# Patient Record
Sex: MALE | Race: WHITE | NOT HISPANIC OR LATINO
[De-identification: names, ages, dates, MRNs, and addresses within clinical notes are randomized per-mention and may not be internally consistent; named-entity substitution may affect disease eponyms.]

---

## 2021-05-03 PROBLEM — Z00.00 ENCOUNTER FOR PREVENTIVE HEALTH EXAMINATION: Status: ACTIVE | Noted: 2021-05-03

## 2021-05-04 ENCOUNTER — APPOINTMENT (OUTPATIENT)
Dept: THORACIC SURGERY | Facility: CLINIC | Age: 61
End: 2021-05-04
Payer: COMMERCIAL

## 2021-05-04 VITALS — HEIGHT: 67 IN | BODY MASS INDEX: 38.92 KG/M2 | WEIGHT: 248 LBS

## 2021-05-04 DIAGNOSIS — T65.291A TOXIC EFFECT OF OTHER TOBACCO AND NICOTINE, ACCIDENTAL (UNINTENTIONAL), INITIAL ENCOUNTER: ICD-10-CM

## 2021-05-04 DIAGNOSIS — Z77.090 CONTACT WITH AND (SUSPECTED) EXPOSURE TO ASBESTOS: ICD-10-CM

## 2021-05-04 PROCEDURE — 99406 BEHAV CHNG SMOKING 3-10 MIN: CPT | Mod: 25

## 2021-05-04 PROCEDURE — G0296 VISIT TO DETERM LDCT ELIG: CPT

## 2021-05-04 PROCEDURE — 99072 ADDL SUPL MATRL&STAF TM PHE: CPT

## 2021-05-04 NOTE — PLAN
[FreeTextEntry1] : Plan:\par \par -Low dose CT chest for lung cancer screening. Dr. Rebollar has been CC'd in order for LDCT.\par \par -Follow up with patient and his referring provider after his LDCT results have been reviewed by the multidisciplinary clinical team.\par \par -Encouraged smoking cessation.\par \par -Referred to smoking cessation program at -[phone] and [employer] [employer]\par \par -Patient declines referral to CTC \par \par \par Should I screen? tool utilized. 6 Year risk of lung cancer is 2.1%. Patient wishes to proceed with screening.\par \par Engaged in discussion regarding risks of screening during Covid-19 pandemic and precautions that are being used  to reduce exposure.\par \par Engaged in shared decision making with Mr. PAREDES . Answered all questions. He verbalized understanding and agreement. He knows to call back with and questions or concerns.\par

## 2021-05-04 NOTE — ASSESSMENT
[Discussed Risks and Advised to Quit Smoking] : Discussed risks and advised to quit smoking [Discussed Cessation Medication] : cessation medication was discussed [Discussed Cessation Strategies] : cessation strategies were discussed [Smoking Cessation Counseling Given (more than 3 min less than10 min) and Resources Provided] : Smoking cessation counseling given (more than 3 min less than 10 min) and resources provided [Not Ready] : Patient is not ready for cessation intervention [Contemplation] : Contemplation: The patient is considering quitting smoking [de-identified] : Acknowledged how  difficult it is to quit smoking. Advised that quitting smoking is the most important thing a person can do for their health. Discussed strategies to deal with cravings. Suggested exercise as a distraction. Discussed the importance of having a strategy planned in advance of quit date. Discussed use of daily nicotine patch and use of gum or lozenge, 1-2 every hour as needed for cravings. Instructed in proper use of gum and patch. He does not wish to try NRT at this point but agrees to contact writer when ready to discuss further. He agrees to accept information for the [facility] smoking cessation support program\par 4 minutes spent in smoking cessation counseling.\par

## 2021-05-04 NOTE — HISTORY OF PRESENT ILLNESS
[Current] : current smoker [_____ pack-years] : [unfilled] pack-years [TextBox_13] : Referred by Dr. Rebollar\par \par ZEYNEP PAREDES had telephonic visit for a review of eligibility and discussion of the Low dose CT lung cancer screening program. A telephonic visit occurred due to the patient not having access to a smart phone or a computer for an audio/visual visit.  The following was reviewed and confirmed the patient meets screening eligibility criteria.\par -Age 60 year\par Smoking Status:\par -Current smoker\par Started smoking at 16 years old. Smoked an average of 1.25 PPD for 33 years+\par , then quit for 10 years, now smoking 8 cigarillos per day for the past 8 years.\par -Number of pack years smoking: Cigarette PY =41 + Cigarillos PY = 6 as per. Total pack years 47\par \par Mr. PAREDES   denies any signs or symptoms of lung cancer including new cough, changing cough, hemoptysis, and unintentional weight loss. \par \par Mr. PAREDES  has history of HTN and "chest" stents placed in 2001. He denies any personal history of lung cancer. Reports no lung cancer in a 1st degree relative. Denies and history of lung disease. He works for [employer] and was present at the [facility] on 9/11 and was there overnight after the collapse of the towers. He is in the [facility] Health Registry. He reports history of  occupational exposures to asbestos.\par

## 2021-05-04 NOTE — REASON FOR VISIT
[Home] : at home, [unfilled] , at the time of the visit. [Medical Office: ([facility])___] : at the medical office located in  [Verbal consent obtained from patient] : the patient, [unfilled] [Initial Evaluation] : an initial evaluation visit [Review of Eligibility] : review of eligibility [Low-Dose CT Screening Discussion] : low-dose CT lung cancer screening discussion

## 2021-05-17 ENCOUNTER — NON-APPOINTMENT (OUTPATIENT)
Age: 61
End: 2021-05-17

## 2021-07-15 ENCOUNTER — RESULT REVIEW (OUTPATIENT)
Age: 61
End: 2021-07-15

## 2022-05-10 ENCOUNTER — APPOINTMENT (OUTPATIENT)
Dept: THORACIC SURGERY | Facility: CLINIC | Age: 62
End: 2022-05-10
Payer: COMMERCIAL

## 2022-05-10 VITALS — WEIGHT: 255 LBS | HEIGHT: 67 IN | BODY MASS INDEX: 40.02 KG/M2

## 2022-05-10 PROCEDURE — G0296 VISIT TO DETERM LDCT ELIG: CPT

## 2022-05-10 NOTE — REASON FOR VISIT
[Home] : at home, [unfilled] , at the time of the visit. [Medical Office: ([facility])___] : at the medical office located in  [Verbal consent obtained from patient] : the patient, [unfilled] [Annual Follow-Up] : an annual follow-up visit [Review of Eligibility] : review of eligibility [Low-Dose CT Screening Discussion] : low-dose CT lung cancer screening discussion

## 2022-05-10 NOTE — ASSESSMENT
[Discussed Risks and Advised to Quit Smoking] : Discussed risks and advised to quit smoking [Ready] : Patient is ready for cessation intervention [Contemplation] : Contemplation: The patient is considering quitting smoking [de-identified] : Acknowledged how  difficult it is to quit smoking. Advised that quitting smoking is the most important thing a person can do for their health.  Discussed the importance of having a strategy planned in advance of quit date. Discussed use of daily nicotine patch. He wants to quit smoking cigarillos cold turkey as he quit cigarettes, He agrees to contact writer at any time he feels he needs assistance in quitting.\par

## 2022-05-10 NOTE — DATA REVIEWED
[Lung Cancer Screening] : Patient underwent lung cancer screening [2] : 2 [TextBox_12] : 05/21 [TextBox_52] : 1

## 2022-05-10 NOTE — HISTORY OF PRESENT ILLNESS
[Current] : current smoker [_____ pack-years] : [unfilled] pack-years [TextBox_13] : Referred by Dr. Rebollar\par \par ZEYNEP PAREDES had telephonic visit for a review of eligibility and discussion of the Low dose CT lung cancer screening program. A telephonic visit occurred due to the patient not having access to a smart phone or a computer for an audio/visual visit.  The following was reviewed and confirmed the patient meets screening eligibility criteria.\par -Age 61 year\par Smoking Status:\par \par -Current smoker\par Smoked 1.25 PPD for 33 years, then quit for 10 years, then smoked 6-8 cigarillos for 9 years.\par 48 Pack years\par \par \par Mr. PAREDES  has morning cough- clear sputum and SAUER when going uphill. He denies any signs or symptoms of lung cancer including new cough, changing cough, hemoptysis, and unintentional weight loss. \par \par Mr. PAREDES history of HTN, CAD with 2 cardiac stents. He  denies any personal history of lung cancer. Reports no lung cancer in a 1st degree relative. Reports no lung cancer in a 2nd degree relative. Denies any history of lung disease. Is retired [employer]. Was at [facility] on 9/11 and is registered with the Health Program. 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

## 2022-05-10 NOTE — PLAN
[Smoking Cessation] : smoking cessation [FreeTextEntry1] : Plan:\par \par -Low dose CT chest for lung cancer screening.Dr. Rebollar has been CC'd in order for LDCT.\par \par -Follow up with patient and his referring provider after his LDCT results have been reviewed by the multidisciplinary clinical team, if needed.\par \par -Encouraged smoking cessation.\par \par -Patient declines referral to CTC and [facility]\par \par Should I screen? tool utilized. 6 Year risk of lung cancer is  2.3 %. \par \par Patient wishes to proceed with screening.\par \par Engaged in discussion regarding risks of screening during Covid-19 pandemic and precautions that are being used  to reduce exposure.\par \par Engaged in shared decision making with Mr. PAREDES . Answered all questions. He verbalized understanding and agreement. He knows to call back with and questions or concerns.\par

## 2022-05-25 ENCOUNTER — NON-APPOINTMENT (OUTPATIENT)
Age: 62
End: 2022-05-25

## 2023-05-09 ENCOUNTER — APPOINTMENT (OUTPATIENT)
Dept: THORACIC SURGERY | Facility: CLINIC | Age: 63
End: 2023-05-09
Payer: COMMERCIAL

## 2023-05-09 VITALS — HEIGHT: 68 IN | WEIGHT: 252 LBS | BODY MASS INDEX: 38.19 KG/M2

## 2023-05-09 PROCEDURE — ACP01: CPT | Mod: NC

## 2023-05-09 NOTE — PLAN
[Smoking Cessation Guidance Provided] : Smoking cessation guidance was provided to patient [[facility]] : referred to [facility] [phone] [Smoking Cessation] : smoking cessation [FreeTextEntry1] : Plan:\par \par -Low dose CT chest for lung cancer screening. Dr. Rebollar ordered the low dose CT.\par \par Auth# [id number] EXP 06/19/2023                                                                          \par \par -Follow up with patient and his referring provider after his LDCT results have been reviewed by the multidisciplinary clinical team, if needed.\par \par -Encouraged smoking cessation.\par \par -Referred to CTC\par \par Should I screen? tool utilized. 6 Year risk of lung cancer is   2.5%. \par \par Patient wishes to proceed with screening.\par \par Engaged in discussion regarding risks of screening during Covid-19 pandemic and precautions that are being used  to reduce exposure.\par \par Engaged in shared decision making with Mr. PAREDES . Answered all questions. He verbalized understanding and agreement. He knows to call back with and questions or concerns.\par

## 2023-05-09 NOTE — ASSESSMENT
[Ready] : Patient is ready for cessation intervention [Contemplation] : Contemplation: The patient is considering quitting smoking [Discussed Risks and Advised to Quit Smoking] : Discussed risks and advised to quit smoking [Discussed Cessation Medication] : cessation medication was discussed [Discussed Cessation Strategies] : cessation strategies were discussed [de-identified] : Acknowledged how  difficult it is to quit smoking. Advised that quitting smoking is the most important thing a person can do for their health. Discussed strategies to deal with cravings.. Discussed the importance of having a strategy planned in advance of quit date. Discussed use of daily nicotine patch and use of gum or lozenge prn for cravings. \par \par 4 minutes spent in smoking cessation counseling.\par

## 2023-05-09 NOTE — DATA REVIEWED
[Lung Cancer Screening] : Patient underwent lung cancer screening [2] : 2 [TextBox_12] : 05/21 [TextBox_27] : 05/22 [TextBox_52] : 2

## 2023-05-09 NOTE — HISTORY OF PRESENT ILLNESS
[Current] : Current [TextBox_13] : Responded to reminder\par PCP Dr. Rebollar\par \par ZEYNEP PAREDES had telephonic visit for a review of eligibility and discussion of the Low dose CT lung cancer screening program. The following was reviewed and confirmed the patient meets screening eligibility criteria.\par \par Smoking Status:\par -Current smoker\par Smoked 1.25 PPD for 33 years, then quit for 10 years, then smoked 6-8 cigarillos for 10 years.\par Pack years 48 \par \par \par Mr. PAREDES has morning cough- white/greyish clear sputum and SAUER after walking up approx. 2.5 -3  flights of stairs. He denies any signs or symptoms of lung cancer including new cough, changing cough, hemoptysis, and unintentional weight loss. \par \par Mr. PAREDES history of HTN, CAD with 2 cardiac stents. Denies any history of lung disease. He denies any personal history of lung cancer. Reports no lung cancer in a 1st degree relative. Reports no lung cancer in a 2nd degree relative. Denies any history of lung disease. Is retired [employer]. Was at [facility] on 9/11 and is registered with the [facility] Program. 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 Had exposure to 2nd hand smoke.\par \par  [PacksperYear] : 48

## 2023-05-15 ENCOUNTER — NON-APPOINTMENT (OUTPATIENT)
Age: 63
End: 2023-05-15

## 2024-05-15 ENCOUNTER — APPOINTMENT (OUTPATIENT)
Dept: THORACIC SURGERY | Facility: CLINIC | Age: 64
End: 2024-05-15
Payer: COMMERCIAL

## 2024-05-15 VITALS — HEIGHT: 67 IN | WEIGHT: 245 LBS | BODY MASS INDEX: 38.45 KG/M2

## 2024-05-15 DIAGNOSIS — F17.210 NICOTINE DEPENDENCE, CIGARETTES, UNCOMPLICATED: ICD-10-CM

## 2024-05-15 PROCEDURE — G0296 VISIT TO DETERM LDCT ELIG: CPT

## 2024-05-15 NOTE — HISTORY OF PRESENT ILLNESS
[Current] : Current [TextBox_13] : Responded to reminder PCP Dr. Keturah ADHIKARI PAREDES had telephonic visit for a review of eligibility and discussion of the Low dose CT lung cancer screening program. The following was reviewed and confirmed the patient meets screening eligibility criteria.  Smoking Status: -Current smoker Smoked 1.25 PPD for 33 years, then quit for 10 years, then smoked 6-8 cigarillos per day for 11 years. Pack years 49  Mr. PAREDES has morning cough- white/greyish clear sputum and reports less SAUER after walking up approx. 2 -3 flights of stairs he attributes to intentional weight loss. He denies any signs or symptoms of lung cancer including new cough, changing cough, hemoptysis, and unintentional weight loss.  Mr. PAREDES history of COPD, HTN, CAD with 2 cardiac stents. Denies  DX of severe Covid infection,  connective tissue disease, and any personal history of cancer.  Reports no lung cancer in a 1st degree relative. Reports no lung cancer in a 2nd degree relative. Is retired [employer]. Was at [facility] on 9/11 and is registered with the [facility] Health Program. 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 Had exposure to 2nd hand smoke.  [PacksperYear] : 49

## 2024-05-15 NOTE — ASSESSMENT
[Not Ready] : Patient is not ready for cessation intervention [Pre-contemplation] : Pre-contemplation: The patient is not thinking about quitting smoking in the next 6 months [de-identified] : Acknowledged how difficult it was to quit smoking. Advised that quitting smoking is the most important thing a person can do for their health.  He agrees to call writer at any point in the future he wishes to discuss smoking cessation programs with [facility].

## 2024-05-15 NOTE — DATA REVIEWED
[Lung Cancer Screening] : Patient underwent lung cancer screening [2] : 2 [TextBox_12] : 05/21 [TextBox_27] : 05/22 [TextBox_42] : 05/23 [TextBox_52] : 4

## 2024-05-15 NOTE — PLAN
[Smoking Cessation] : smoking cessation [FreeTextEntry1] : Plan:  -Low dose CT chest for lung cancer screening. Dr. Rebollar ordered the low dose CT.        -Follow up with  his referring provider after his LDCT results have been reviewed by the multidisciplinary clinical team, if needed.   -Encouraged smoking cessation.   -Patient declines referral to CTC and [facility]   Should I screen? tool utilized. 6 Year risk of lung cancer is   3.6%.   Patient wishes to proceed with screening.  Engaged in discussion regarding risks of screening during Covid-19 pandemic and precautions that are being used  to reduce exposure.  Engaged in shared decision making with Mr. PAREDES . Answered all questions. He verbalized understanding and agreement. He knows to call back with and questions or concerns.

## 2024-05-22 ENCOUNTER — RESULT REVIEW (OUTPATIENT)
Age: 64
End: 2024-05-22

## 2024-05-22 ENCOUNTER — APPOINTMENT (OUTPATIENT)
Dept: ORTHOPEDIC SURGERY | Facility: CLINIC | Age: 64
End: 2024-05-22
Payer: MEDICARE

## 2024-05-22 VITALS — BODY MASS INDEX: 38.45 KG/M2 | HEIGHT: 67 IN | WEIGHT: 245 LBS

## 2024-05-22 DIAGNOSIS — M17.0 BILATERAL PRIMARY OSTEOARTHRITIS OF KNEE: ICD-10-CM

## 2024-05-22 PROCEDURE — 99204 OFFICE O/P NEW MOD 45 MIN: CPT | Mod: 57

## 2024-05-22 NOTE — DISCUSSION/SUMMARY
[de-identified] : This patient has failed non-operative treatments for right knee arthritis and is now indicated for a total knee replacement.  I had a long discussion with the patient regarding the plan, the expected outcome, the risks, benefits, and alternatives of surgery. The risks include, but are not limited to, infection (which may require future surgery and removal of implants), bleeding (which may require a transfusion), damage to nerves, arteries, veins, tendons, muscles and other adjacent structures leading to numbness, weakness, decreased function and/or possible surgical repair. Also discussed the possibilities of intraoperative and post operative fractures, implant loosening, stiffness, need for extensive therapy and revision for variety of reasons, blood clots and other possible medical complications etc. possibly left   history of stents and smoking so only one at a time henley

## 2024-05-22 NOTE — PHYSICAL EXAM
[de-identified] : Knee ranges 5-115 degrees with pain and crepitus stable to varus, valgus, anterior and posterior stress neurovascularly intact distally no pain with hip range of motion negative straight leg raise no effusion, synovitis, or edema or erythema skin intact both [de-identified] : multiple views of the knees show severe arthritis with bone on bone contact, tawana-articular osteophytes, subchondral sclerosis and cysts both knee standing

## 2024-05-22 NOTE — HISTORY OF PRESENT ILLNESS
Walk in [de-identified] : Patient comes in with more than 6 months of left knee pain atraumatic in onset.  Patient has tried greater than 6 months of nsaids, physical therapy and doctor directed exercises and injections.  Patient continues to have pain that is 8/10 in severity and interferes with activities of daily living such as putting on shoes and socks, walking two blocks, and getting up and down from a chair and toilet.  Knee osteoarthritis outcome score is 8.1 Patient comes in with more than 6 months of right knee pain atraumatic in onset.  Patient has tried greater than 6 months of nsaids, physical therapy and doctor directed exercises and injections.  Patient continues to have pain that is 8/10 in severity and interferes with activities of daily living such as putting on shoes and socks, walking two blocks, and getting up and down from a chair and toilet.  Knee osteoarthritis outcome score is 8.1

## 2025-05-21 ENCOUNTER — APPOINTMENT (OUTPATIENT)
Dept: PULMONOLOGY | Facility: CLINIC | Age: 65
End: 2025-05-21
Payer: MEDICARE

## 2025-05-21 VITALS — HEIGHT: 67 IN | BODY MASS INDEX: 37.67 KG/M2 | WEIGHT: 240 LBS

## 2025-05-21 DIAGNOSIS — F17.210 NICOTINE DEPENDENCE, CIGARETTES, UNCOMPLICATED: ICD-10-CM

## 2025-05-21 PROCEDURE — G0296 VISIT TO DETERM LDCT ELIG: CPT | Mod: 93

## 2025-06-03 ENCOUNTER — RESULT REVIEW (OUTPATIENT)
Age: 65
End: 2025-06-03